# Patient Record
Sex: FEMALE
[De-identification: names, ages, dates, MRNs, and addresses within clinical notes are randomized per-mention and may not be internally consistent; named-entity substitution may affect disease eponyms.]

---

## 2017-04-09 ENCOUNTER — LAB SERVICES (OUTPATIENT)
Dept: OTHER | Age: 37
End: 2017-04-09

## 2017-04-09 ENCOUNTER — CHARTING TRANS (OUTPATIENT)
Dept: OTHER | Age: 37
End: 2017-04-09

## 2017-04-09 LAB
APPEARANCE: CLEAR
BILIRUBIN: NORMAL
COLOR: NORMAL
GLUCOSE U: NORMAL
KETONES: NORMAL
LEUKOCYTES: NORMAL
NITRITE: NORMAL
OCCULT BLOOD: NORMAL
PH: NORMAL
PROTEIN: NORMAL
URINE SPEC GRAVITY: 1.03
UROBILINOGEN: NORMAL

## 2017-04-12 LAB — BACTERIA UR CULT: NORMAL

## 2018-02-25 ENCOUNTER — LAB SERVICES (OUTPATIENT)
Dept: OTHER | Age: 38
End: 2018-02-25

## 2018-02-25 ENCOUNTER — CHARTING TRANS (OUTPATIENT)
Dept: OTHER | Age: 38
End: 2018-02-25

## 2018-02-25 LAB
APPEARANCE: NORMAL
BILIRUBIN: NORMAL
COLOR: NORMAL
GLUCOSE U: NORMAL
KETONES: NORMAL
LEUKOCYTE ESTERASE: NORMAL
LEUKOCYTES: NORMAL
NITRITE: NORMAL
OCCULT BLOOD: NORMAL
PH: NORMAL
PROTEIN: NORMAL
URINE SPEC GRAVITY: NORMAL
UROBILINOGEN: NORMAL

## 2018-02-27 LAB — BACTERIA UR CULT: NORMAL

## 2018-11-01 VITALS
RESPIRATION RATE: 16 BRPM | DIASTOLIC BLOOD PRESSURE: 60 MMHG | HEIGHT: 65 IN | SYSTOLIC BLOOD PRESSURE: 108 MMHG | TEMPERATURE: 98.9 F | BODY MASS INDEX: 21.66 KG/M2 | WEIGHT: 130 LBS | HEART RATE: 77 BPM | OXYGEN SATURATION: 99 %

## 2018-11-05 VITALS
WEIGHT: 130 LBS | SYSTOLIC BLOOD PRESSURE: 110 MMHG | OXYGEN SATURATION: 98 % | BODY MASS INDEX: 21.66 KG/M2 | HEIGHT: 65 IN | TEMPERATURE: 98.4 F | RESPIRATION RATE: 16 BRPM | HEART RATE: 78 BPM | DIASTOLIC BLOOD PRESSURE: 60 MMHG

## 2019-01-18 ENCOUNTER — APPOINTMENT (OUTPATIENT)
Dept: GENERAL RADIOLOGY | Age: 39
End: 2019-01-18
Attending: EMERGENCY MEDICINE
Payer: COMMERCIAL

## 2019-01-18 ENCOUNTER — HOSPITAL ENCOUNTER (OUTPATIENT)
Age: 39
Discharge: HOME | End: 2019-01-18
Attending: EMERGENCY MEDICINE
Payer: COMMERCIAL

## 2019-01-18 VITALS
TEMPERATURE: 97 F | HEIGHT: 65 IN | BODY MASS INDEX: 23.32 KG/M2 | SYSTOLIC BLOOD PRESSURE: 132 MMHG | HEART RATE: 75 BPM | OXYGEN SATURATION: 98 % | RESPIRATION RATE: 16 BRPM | DIASTOLIC BLOOD PRESSURE: 79 MMHG | WEIGHT: 140 LBS

## 2019-01-18 DIAGNOSIS — R05.9 COUGH: Primary | ICD-10-CM

## 2019-01-18 LAB

## 2019-01-18 PROCEDURE — 99204 OFFICE O/P NEW MOD 45 MIN: CPT

## 2019-01-18 PROCEDURE — 99203 OFFICE O/P NEW LOW 30 MIN: CPT

## 2019-01-18 PROCEDURE — 87633 RESP VIRUS 12-25 TARGETS: CPT | Performed by: EMERGENCY MEDICINE

## 2019-01-18 PROCEDURE — 87798 DETECT AGENT NOS DNA AMP: CPT | Performed by: EMERGENCY MEDICINE

## 2019-01-18 PROCEDURE — 87486 CHLMYD PNEUM DNA AMP PROBE: CPT | Performed by: EMERGENCY MEDICINE

## 2019-01-18 PROCEDURE — 71046 X-RAY EXAM CHEST 2 VIEWS: CPT | Performed by: EMERGENCY MEDICINE

## 2019-01-18 PROCEDURE — 87430 STREP A AG IA: CPT

## 2019-01-18 PROCEDURE — 87581 M.PNEUMON DNA AMP PROBE: CPT | Performed by: EMERGENCY MEDICINE

## 2019-01-18 RX ORDER — ALBUTEROL SULFATE 90 UG/1
2 AEROSOL, METERED RESPIRATORY (INHALATION) EVERY 6 HOURS PRN
Qty: 1 INHALER | Refills: 0 | Status: SHIPPED | OUTPATIENT
Start: 2019-01-18 | End: 2019-02-17

## 2019-01-18 RX ORDER — CHLORAL HYDRATE 500 MG
1000 CAPSULE ORAL DAILY
COMMUNITY

## 2019-01-18 RX ORDER — RIBOFLAVIN (VITAMIN B2) 100 MG
100 TABLET ORAL DAILY
COMMUNITY
End: 2021-12-07

## 2019-01-18 RX ORDER — PYRIDOXINE HCL (VITAMIN B6) 100 MG
TABLET ORAL
COMMUNITY

## 2019-01-18 RX ORDER — FAMOTIDINE 20 MG
TABLET ORAL
COMMUNITY

## 2019-01-18 NOTE — ED NOTES
Patient returned to [address] [place] [address] asking for respiratory panel flu expanded. RN spoke with Prachi Carrillo (In [address]) regarding cancelling previous sent pertussis order.   Pertussis order cancel and respiratory panel flu expanded to be sent instead per patient request.

## 2019-01-18 NOTE — ED INITIAL ASSESSMENT (HPI)
Patient states having a dry cough worsening since Sunday. Patient states her son was diagnosed recently with RSV and her daughter was diagnosed with Influenza B. Patient denies having fever. Patient states having SOB, states having chills, body aches.

## 2019-01-18 NOTE — ED PROVIDER NOTES
Patient Seen in: [address]    History   Patient presents with:  Cough/URI    Stated Complaint: cough    HPI    Patient complains of sore throat cough chest pain and shortness of breath. Patient became ill 6 days ago. (Oral)   Resp 16   Ht 165.1 cm (5' 5\")   Wt 63.5 kg   SpO2 98%   Breastfeeding?  Yes   BMI 23.30 kg/m²         Physical Exam    The patient is awake and alert nontoxic in appearance  Eyes pupils are equal reactive sclera nonicteric  ENT ears tympanic membr treatment  of wheezing and shortness of breath. Discussed available testing with the patient. Discussed that flu testing and RSV testing including that of a respiratory panel if any results are positive would not result in change of treatment.   Recommend

## 2021-04-30 ENCOUNTER — HOSPITAL ENCOUNTER (OUTPATIENT)
Age: 41
Discharge: HOME | End: 2021-04-30
Payer: COMMERCIAL

## 2021-04-30 VITALS
DIASTOLIC BLOOD PRESSURE: 68 MMHG | SYSTOLIC BLOOD PRESSURE: 122 MMHG | HEART RATE: 77 BPM | HEIGHT: 65 IN | TEMPERATURE: 98 F | BODY MASS INDEX: 20.83 KG/M2 | OXYGEN SATURATION: 98 % | RESPIRATION RATE: 20 BRPM | WEIGHT: 125 LBS

## 2021-04-30 DIAGNOSIS — J02.9 ACUTE PHARYNGITIS, UNSPECIFIED ETIOLOGY: ICD-10-CM

## 2021-04-30 DIAGNOSIS — H65.02 ACUTE SEROUS OTITIS MEDIA OF LEFT EAR, RECURRENCE NOT SPECIFIED: Primary | ICD-10-CM

## 2021-04-30 DIAGNOSIS — Z20.822 ENCOUNTER FOR SCREENING LABORATORY TESTING FOR COVID-19 VIRUS: ICD-10-CM

## 2021-04-30 DIAGNOSIS — J01.90 ACUTE SINUSITIS, RECURRENCE NOT SPECIFIED, UNSPECIFIED LOCATION: ICD-10-CM

## 2021-04-30 PROCEDURE — 99203 OFFICE O/P NEW LOW 30 MIN: CPT | Performed by: PHYSICIAN ASSISTANT

## 2021-04-30 PROCEDURE — 87880 STREP A ASSAY W/OPTIC: CPT | Performed by: PHYSICIAN ASSISTANT

## 2021-04-30 PROCEDURE — U0002 COVID-19 LAB TEST NON-CDC: HCPCS | Performed by: PHYSICIAN ASSISTANT

## 2021-04-30 RX ORDER — FLUTICASONE PROPIONATE 50 MCG
2 SPRAY, SUSPENSION (ML) NASAL DAILY
Qty: 16 G | Refills: 0 | Status: SHIPPED | OUTPATIENT
Start: 2021-04-30 | End: 2021-05-30

## 2021-04-30 RX ORDER — BUPROPION HYDROCHLORIDE 150 MG/1
150 TABLET ORAL DAILY
COMMUNITY

## 2021-04-30 RX ORDER — AMOXICILLIN AND CLAVULANATE POTASSIUM 875; 125 MG/1; MG/1
1 TABLET, FILM COATED ORAL 2 TIMES DAILY
Qty: 14 TABLET | Refills: 0 | Status: SHIPPED | OUTPATIENT
Start: 2021-04-30 | End: 2021-05-07

## 2021-04-30 NOTE — ED INITIAL ASSESSMENT (HPI)
Pt c/o sore throat, head pressure, nasal congestion x6 days. States her kids with colds and tested neg for covid. No known covid exposure. Awake/alert. Breathing easy and even without distress. Speech clear. Skin warm, dry and pink.

## 2021-04-30 NOTE — ED PROVIDER NOTES
Patient Seen in: [facility]    History   Patient presents with:  Sinus Problem    Stated Complaint: sinus problem    HPI    41-year-old female presents with chief complaint of left earache. Onset 6 days ago.   Patient reports associated sore Age of Onset   • Macular degeneration Father    • Asthma Sister         half sister       Social History    Tobacco Use      Smoking status: Never Smoker      Smokeless tobacco: Never Used    Vaping Use      Vaping Use: Never used    Alcohol use: Not Curre wheezes, or rhonchi. There is no stridor. Air entry is equal.  Cardiovascular: Regular rate and rhythm, no murmurs, gallops, rubs. Capillary refill is brisk. No extremity edema. Gastrointestinal: Abdomen soft, nontender, nondistended.   There is no roseline [address] [facility]  [phone]    Schedule an appointment as soon as possible for a visit in 2 days  For follow-up      Medications Prescribed:  Current Discharge Medication List    START taking these medications    Amoxicillin

## 2021-05-30 ENCOUNTER — HOSPITAL ENCOUNTER (OUTPATIENT)
Age: 41
Discharge: HOME | End: 2021-05-30
Attending: PHYSICIAN ASSISTANT
Payer: COMMERCIAL

## 2021-05-30 VITALS
SYSTOLIC BLOOD PRESSURE: 123 MMHG | OXYGEN SATURATION: 100 % | WEIGHT: 130 LBS | TEMPERATURE: 98 F | RESPIRATION RATE: 17 BRPM | BODY MASS INDEX: 21.66 KG/M2 | HEART RATE: 78 BPM | HEIGHT: 65 IN | DIASTOLIC BLOOD PRESSURE: 78 MMHG

## 2021-05-30 DIAGNOSIS — N39.0 URINARY TRACT INFECTION WITH HEMATURIA, SITE UNSPECIFIED: Primary | ICD-10-CM

## 2021-05-30 DIAGNOSIS — R31.9 URINARY TRACT INFECTION WITH HEMATURIA, SITE UNSPECIFIED: Primary | ICD-10-CM

## 2021-05-30 PROCEDURE — 81025 URINE PREGNANCY TEST: CPT | Performed by: PHYSICIAN ASSISTANT

## 2021-05-30 PROCEDURE — 99213 OFFICE O/P EST LOW 20 MIN: CPT | Performed by: PHYSICIAN ASSISTANT

## 2021-05-30 PROCEDURE — 87086 URINE CULTURE/COLONY COUNT: CPT | Performed by: PHYSICIAN ASSISTANT

## 2021-05-30 RX ORDER — CEPHALEXIN 500 MG/1
500 CAPSULE ORAL 3 TIMES DAILY
Qty: 21 CAPSULE | Refills: 0 | Status: SHIPPED | OUTPATIENT
Start: 2021-05-30 | End: 2021-06-06

## 2021-05-30 NOTE — ED PROVIDER NOTES
Patient Seen in: [facility]    History   Patient presents with:  Urinary Symptoms    Stated Complaint: bladder problem    HPI    Hai Rios is a 38year old female who presents with chief complaint of dysuria. Onset 2 days ago. Smoking status: Never Smoker      Smokeless tobacco: Never Used    Vaping Use      Vaping Use: Never used    Alcohol use: Not Currently    Drug use: Never      Review of Systems    Positive for stated complaint: bladder problem  Other systems are as not deformity. Neurological: Gross motor movement is intact in all 4 extremities. Patient exhibits normal speech. Skin: Skin is normal to inspection. Warm and dry. No obvious bruising. No obvious rash.           ED Course     Labs Reviewed   POCT [facility]

## 2021-05-30 NOTE — ED INITIAL ASSESSMENT (HPI)
Pt presents to the IC with c/o a possible uti. Reports burning with urination, urgency and frequency. Hx of multiple uti's in the last year. Pt was last taking macrobid.

## 2021-06-13 ENCOUNTER — HOSPITAL ENCOUNTER (OUTPATIENT)
Age: 41
Discharge: HOME | End: 2021-06-13
Payer: COMMERCIAL

## 2021-06-13 VITALS
HEART RATE: 86 BPM | DIASTOLIC BLOOD PRESSURE: 70 MMHG | SYSTOLIC BLOOD PRESSURE: 102 MMHG | TEMPERATURE: 98 F | RESPIRATION RATE: 16 BRPM | OXYGEN SATURATION: 100 %

## 2021-06-13 DIAGNOSIS — N30.90 CYSTITIS: Primary | ICD-10-CM

## 2021-06-13 PROCEDURE — 81025 URINE PREGNANCY TEST: CPT | Performed by: NURSE PRACTITIONER

## 2021-06-13 PROCEDURE — 87086 URINE CULTURE/COLONY COUNT: CPT | Performed by: NURSE PRACTITIONER

## 2021-06-13 PROCEDURE — 81002 URINALYSIS NONAUTO W/O SCOPE: CPT | Performed by: NURSE PRACTITIONER

## 2021-06-13 PROCEDURE — 99203 OFFICE O/P NEW LOW 30 MIN: CPT | Performed by: NURSE PRACTITIONER

## 2021-06-13 RX ORDER — NITROFURANTOIN 25; 75 MG/1; MG/1
100 CAPSULE ORAL 2 TIMES DAILY
Qty: 14 CAPSULE | Refills: 0 | Status: SHIPPED | OUTPATIENT
Start: 2021-06-13 | End: 2021-06-20

## 2021-06-13 NOTE — ED PROVIDER NOTES
Patient Seen in: [facility]    History   CC: urinary pain  HPI: Wesley Mcgovern 38year old female  who presents c/o burning dysuria, urgency and frequency which first began yesterday. + Currently on AZO.   Similar symptoms patient advis Fumarate-FA (PRENATAL COMPLETE) 14-0.4 MG Oral Tab,  Take by mouth. Patient not taking: Reported on 4/30/2021    Calcium 500-125 MG-UNIT Oral Tab,  Take by mouth.   Patient not taking: Reported on 6/13/2021    omega-3 fatty acids 1000 MG Oral Cap,  Take 1, previous visit on 5/30/2021 for similar symptoms and no growth on urine culture.   Will empirically initiate treatment with Macrobid as patient states she has had better success with this in the past.  Advised her to continue her plan with GYN follow-up thi

## 2021-06-13 NOTE — ED INITIAL ASSESSMENT (HPI)
Pt started with urinary urgency and pain yesterday, had a Urinary symptoms on May 23rd and culture did not grow anything.

## 2021-11-13 ENCOUNTER — HOSPITAL ENCOUNTER (OUTPATIENT)
Age: 41
Discharge: HOME | End: 2021-11-13
Payer: COMMERCIAL

## 2021-11-13 VITALS
RESPIRATION RATE: 18 BRPM | TEMPERATURE: 98 F | OXYGEN SATURATION: 100 % | DIASTOLIC BLOOD PRESSURE: 75 MMHG | SYSTOLIC BLOOD PRESSURE: 132 MMHG | HEART RATE: 80 BPM

## 2021-11-13 DIAGNOSIS — N30.00 ACUTE CYSTITIS WITHOUT HEMATURIA: Primary | ICD-10-CM

## 2021-11-13 PROCEDURE — 81025 URINE PREGNANCY TEST: CPT | Performed by: NURSE PRACTITIONER

## 2021-11-13 PROCEDURE — 99213 OFFICE O/P EST LOW 20 MIN: CPT | Performed by: NURSE PRACTITIONER

## 2021-11-13 PROCEDURE — 87086 URINE CULTURE/COLONY COUNT: CPT | Performed by: NURSE PRACTITIONER

## 2021-11-13 PROCEDURE — 81002 URINALYSIS NONAUTO W/O SCOPE: CPT | Performed by: NURSE PRACTITIONER

## 2021-11-13 RX ORDER — CEPHALEXIN 500 MG/1
500 CAPSULE ORAL 4 TIMES DAILY
Qty: 28 CAPSULE | Refills: 0 | Status: SHIPPED | OUTPATIENT
Start: 2021-11-13 | End: 2021-11-20

## 2021-11-13 NOTE — ED PROVIDER NOTES
Patient Seen in: [facility]      History   Patient presents with:  Urinary Symptoms    Stated Complaint: urinary problem    Subjective: The history is provided by the patient. Dysuria   This is a new problem.  The current episode started 10/30/2021   SpO2 100%         Physical Exam  Vitals and nursing note reviewed. Constitutional:       Appearance: She is well-developed. HENT:      Head: Normocephalic and atraumatic.       Right Ear: External ear normal.      Left Ear: External ear nor ago.  Patient states she also has urinary frequency. Patient states that she has been taking over-the-counter Azo. Patient denies back pain. There is no CVA tenderness. Denies abdominal pain. Benign abdomen. Denies pelvic pain.   Denies vaginal discha

## 2021-12-28 ENCOUNTER — ORDER TRANSCRIPTION (OUTPATIENT)
Dept: PHYSICAL THERAPY | Facility: HOSPITAL | Age: 41
End: 2021-12-28

## 2021-12-28 DIAGNOSIS — M41.9 SCOLIOSIS, UNSPECIFIED SCOLIOSIS TYPE, UNSPECIFIED SPINAL REGION: Primary | ICD-10-CM

## 2022-01-31 ENCOUNTER — TELEPHONE (OUTPATIENT)
Dept: PHYSICAL THERAPY | Facility: HOSPITAL | Age: 42
End: 2022-01-31

## 2022-02-01 ENCOUNTER — OFFICE VISIT (OUTPATIENT)
Dept: PHYSICAL THERAPY | Age: 42
End: 2022-02-01
Attending: PHYSICIAN ASSISTANT
Payer: COMMERCIAL

## 2022-02-01 DIAGNOSIS — M41.9 SCOLIOSIS, UNSPECIFIED SCOLIOSIS TYPE, UNSPECIFIED SPINAL REGION: ICD-10-CM

## 2022-02-01 PROCEDURE — 97162 PT EVAL MOD COMPLEX 30 MIN: CPT

## 2022-02-01 PROCEDURE — 97530 THERAPEUTIC ACTIVITIES: CPT

## 2022-02-01 PROCEDURE — 97112 NEUROMUSCULAR REEDUCATION: CPT

## 2022-02-08 ENCOUNTER — OFFICE VISIT (OUTPATIENT)
Dept: PHYSICAL THERAPY | Age: 42
End: 2022-02-08
Attending: PHYSICIAN ASSISTANT
Payer: COMMERCIAL

## 2022-02-08 DIAGNOSIS — M41.9 SCOLIOSIS, UNSPECIFIED SCOLIOSIS TYPE, UNSPECIFIED SPINAL REGION: ICD-10-CM

## 2022-02-08 PROCEDURE — 97110 THERAPEUTIC EXERCISES: CPT

## 2022-02-08 PROCEDURE — 97140 MANUAL THERAPY 1/> REGIONS: CPT

## 2022-02-08 PROCEDURE — 97112 NEUROMUSCULAR REEDUCATION: CPT

## 2022-02-08 NOTE — PROGRESS NOTES
Diagnosis: Scoliosis, unspecified scoliosis type, unspecified spinal region (M41.9)  Insurance (Authorized # of Visits):  Jaquelin Villar Physician: Dr. Chris Hardy MD visit: none scheduled  Fall Risk: standard         Precautions: n/a           Cancellations: 0   No Shows: 0  Most recent Evaluation/Progress note: Visit 1 on 2/1/22  Subjective: Pt reports she was very sore after last session for 4 days. She felt pain into her lower right side and into her neck. Currently she still feels some pain in those regions. She saw her pelvic floor therapist. Focused on diaphragmatic breathing to take away from excessive cervical breathing. Current pain: 5/10    Assessment: Pt presents with moderate soft tissue restrictions at thoracic and lumbar paraspinals with TTP. Improved with STM with theragun. Reviewed HEP and initiated strengthening work. Pt fatigues very quickly with exercises. Able to achieve good diaphragmatic breathing and lateral costal expansion by end of session. Moderate difficulty with low trap activation, unable to achieve prone lift off. Improved TrA activation from previous session. Objective: (See Flowsheet Below)    Goals: To be met in 10-12 sessions  1. Pt will verbalize and demo compliance with HEP at least 75% of the time to allow for independent management of symptoms at discharge. - in progress  2. Pt will demo full and pain free trunk mobility - in progress  3. Pt will demo improved core and scapular stability and strength to at least 4/5 for improved stability with functional mobility - in progress  4. Pt will verbalize and demo methods for achieving and maintaining best 3D alignment to decrease future risk for progression and effect of muscle imbalance - in progress  5. Pt will demo symmetrical lateral costal expansion and decreased upper chest/cervical breathing to decrease load on cervical spine - in progress       Date: 2/8/22  Tx #: 2 Date: Tx#: Date:   Tx#:   Therapeutic exercises Prone low trap lift off - stopped secondary to lack of activation    Prone low trap press back - stopped secondary to lack of activation    Standing pivot prone sustained hold then progressed to slight movement/elevation    Supine TrA activation    Short hanging at stall bar    Seated self 1st rib mob/STM with sheet/lacrosse ball    luis f pose fwd and diagonal within limitations     Manual Therapy Prone: theragun/STM B thoracic and lumbar paraspinals     Neuromuscular Re-education Supine diaphragmatic breathing with self cueing    Supine lateral costal expansion with self palpation then with use of RTB for cueing       Current HEP:   2/1: TrA activation, supine lateral costal expansion with maintenance on exhalation, hanging if pull up bar purchased, posture awareness throughout the day  2/8: pivot prone, luis f pose fwd/diagonal, diaphragmatic breathing, lateral costal expansion with RTB for cueing, self 1st rib mob/STM with sheet/ball - pictures/videos taken for reference    Plan: Assess effect of HEP progressions. Continue with manual interventions as indicated. Assess QL. Review low trap activation and may re-try prone low trap. May initiate resisted THELMA with use of mirror cueing for posture cueing. Initiate isometric press down with ball. Initiate combination of diaphragmatic breathing and lateral costal expansion     Charges:  TherEx x2, Man x1, Neuro Re-ed x1       Total Timed Treatment: 55 min  Total Treatment Time: 55 min

## 2022-02-11 ENCOUNTER — TELEPHONE (OUTPATIENT)
Dept: PHYSICAL THERAPY | Facility: HOSPITAL | Age: 42
End: 2022-02-11

## 2022-02-15 ENCOUNTER — APPOINTMENT (OUTPATIENT)
Dept: PHYSICAL THERAPY | Age: 42
End: 2022-02-15
Attending: PHYSICIAN ASSISTANT
Payer: COMMERCIAL

## 2022-02-18 ENCOUNTER — OFFICE VISIT (OUTPATIENT)
Dept: PHYSICAL THERAPY | Age: 42
End: 2022-02-18
Attending: PHYSICIAN ASSISTANT
Payer: COMMERCIAL

## 2022-02-18 PROCEDURE — 97112 NEUROMUSCULAR REEDUCATION: CPT

## 2022-02-18 PROCEDURE — 97110 THERAPEUTIC EXERCISES: CPT

## 2022-02-18 PROCEDURE — 97140 MANUAL THERAPY 1/> REGIONS: CPT

## 2022-02-18 NOTE — PROGRESS NOTES
Diagnosis: Scoliosis, unspecified scoliosis type, unspecified spinal region (M41.9)  Insurance (Authorized # of Visits):  Vi Henderson Physician: Dr. Alex Franklin Next MD visit: none scheduled  Fall Risk: standard         Precautions: n/a           Cancellations: 0   No Shows: 0  Most recent Evaluation/Progress note: Visit 1 on 2/1/22  Subjective: Pt reports she is having a lot of pain into her neck and back. She did not feel she was doing the exercise on the wall correctly because she was feeling a lot in her neck. Current pain: 4/10    Assessment: Continued with manual interventions. Incorporated manual interventions at cervical musculature as this appears to inhibit proper scapular muscle activation. RTB may be too much resistance for pt at this time as she is able to demo best activation with THELMA without resistance. Able to achieve good L lateral costal expansion with cueing although pt does continue to fatigue quickly with exercises. Discussed posture with standing, use of TrA activation for stability and possible use of SI belt based on pt symptom presentation which she will assess at next event involving prolonged standing on Sunday. Objective: (See Flowsheet Below)    Goals: To be met in 10-12 sessions  1. Pt will verbalize and demo compliance with HEP at least 75% of the time to allow for independent management of symptoms at discharge. - in progress  2. Pt will demo full and pain free trunk mobility - in progress  3. Pt will demo improved core and scapular stability and strength to at least 4/5 for improved stability with functional mobility - in progress  4. Pt will verbalize and demo methods for achieving and maintaining best 3D alignment to decrease future risk for progression and effect of muscle imbalance - in progress  5.  Pt will demo symmetrical lateral costal expansion and decreased upper chest/cervical breathing to decrease load on cervical spine - in progress       Date: 2/8/22  Tx #: 2 Date: 2/18/22  Tx#: 3 Date: Tx#:   Therapeutic exercises Prone low trap lift off - stopped secondary to lack of activation    Prone low trap press back - stopped secondary to lack of activation    Standing pivot prone sustained hold then progressed to slight movement/elevation    Supine TrA activation    Short hanging at stall bar    Seated self 1st rib mob/STM with sheet/lacrosse ball    luis f pose fwd and diagonal within limitations Pivot prone at wall -stopped secondary to cervical compensation    Standing THELMA RTB - stopped secondary to cervical compensation    Supine THELMA RTB [id number]    Supine THELMA no resistance    Manual Therapy Prone: theragun/STM B thoracic and lumbar paraspinals Prone: theragun/STM B thoracic and lumbar paraspinals    Seated: TP release UTs    Neuromuscular Re-education Supine diaphragmatic breathing with self cueing    Supine lateral costal expansion with self palpation then with use of RTB for cueing Supine diaphragmatic breathing with self cueing    Supine lateral costal expansion with self palpation then wiht maintenance on exhalation     Supine lateral costal expansion with focus on L lateral with maintenance on exhalation    Seated lateral costal expansion focus on L      Current HEP:   2/1: TrA activation, supine lateral costal expansion with maintenance on exhalation, hanging if pull up bar purchased, posture awareness throughout the day  2/8: pivot prone, luis f pose fwd/diagonal, diaphragmatic breathing, lateral costal expansion with RTB for cueing, self 1st rib mob/STM with sheet/ball - pictures/videos taken for reference  2/18: d/c pivot prone and replace wiht supine THELMA no resistance, lateral costal expansion focused on L with maintenance on exhalation    Plan: Pt to assess symptoms presentation with prolonged standing and email PT with update. May trial SI belt for stability. Assess effect of HEP changes Continue with manual interventions as indicated.  Assess QL. Review low trap activation and may re-try prone low trap. Initiate isometric press down with ball. Initiate posture work with lifting. Charges:  TherEx x1, Man x1, Neuro Re-ed x2       Total Timed Treatment: 55 min  Total Treatment Time: 55 min

## 2022-02-21 ENCOUNTER — APPOINTMENT (OUTPATIENT)
Dept: PHYSICAL THERAPY | Age: 42
End: 2022-02-21
Attending: PHYSICIAN ASSISTANT
Payer: COMMERCIAL

## 2022-02-28 ENCOUNTER — APPOINTMENT (OUTPATIENT)
Dept: PHYSICAL THERAPY | Age: 42
End: 2022-02-28
Attending: PHYSICIAN ASSISTANT
Payer: COMMERCIAL

## 2022-03-04 ENCOUNTER — APPOINTMENT (OUTPATIENT)
Dept: PHYSICAL THERAPY | Age: 42
End: 2022-03-04
Attending: PHYSICIAN ASSISTANT
Payer: COMMERCIAL

## 2022-03-14 ENCOUNTER — APPOINTMENT (OUTPATIENT)
Dept: PHYSICAL THERAPY | Age: 42
End: 2022-03-14
Attending: PHYSICIAN ASSISTANT
Payer: COMMERCIAL

## 2022-03-15 ENCOUNTER — APPOINTMENT (OUTPATIENT)
Dept: PHYSICAL THERAPY | Age: 42
End: 2022-03-15
Attending: PHYSICIAN ASSISTANT
Payer: COMMERCIAL

## 2022-03-21 ENCOUNTER — APPOINTMENT (OUTPATIENT)
Dept: PHYSICAL THERAPY | Age: 42
End: 2022-03-21
Attending: PHYSICIAN ASSISTANT
Payer: COMMERCIAL

## 2022-03-22 ENCOUNTER — APPOINTMENT (OUTPATIENT)
Dept: PHYSICAL THERAPY | Age: 42
End: 2022-03-22
Attending: PHYSICIAN ASSISTANT
Payer: COMMERCIAL

## 2022-03-28 ENCOUNTER — APPOINTMENT (OUTPATIENT)
Dept: PHYSICAL THERAPY | Age: 42
End: 2022-03-28
Attending: PHYSICIAN ASSISTANT
Payer: COMMERCIAL

## 2022-03-29 ENCOUNTER — APPOINTMENT (OUTPATIENT)
Dept: PHYSICAL THERAPY | Age: 42
End: 2022-03-29
Attending: PHYSICIAN ASSISTANT
Payer: COMMERCIAL

## 2022-04-05 ENCOUNTER — APPOINTMENT (OUTPATIENT)
Dept: PHYSICAL THERAPY | Age: 42
End: 2022-04-05
Attending: INTERNAL MEDICINE
Payer: COMMERCIAL

## 2022-04-12 ENCOUNTER — APPOINTMENT (OUTPATIENT)
Dept: PHYSICAL THERAPY | Age: 42
End: 2022-04-12
Attending: INTERNAL MEDICINE
Payer: COMMERCIAL

## 2023-01-15 ENCOUNTER — HOSPITAL ENCOUNTER (OUTPATIENT)
Age: 43
Discharge: HOME | End: 2023-01-15
Payer: COMMERCIAL

## 2023-01-15 VITALS
SYSTOLIC BLOOD PRESSURE: 117 MMHG | HEART RATE: 82 BPM | DIASTOLIC BLOOD PRESSURE: 65 MMHG | OXYGEN SATURATION: 99 % | TEMPERATURE: 98 F | RESPIRATION RATE: 18 BRPM

## 2023-01-15 DIAGNOSIS — N30.90 CYSTITIS: Primary | ICD-10-CM

## 2023-01-15 DIAGNOSIS — R30.0 DYSURIA: ICD-10-CM

## 2023-01-15 LAB
B-HCG UR QL: NEGATIVE
CLARITY UR: CLEAR
GLUCOSE UR STRIP-MCNC: 250 MG/DL
KETONES UR STRIP-MCNC: 15 MG/DL
NITRITE UR QL STRIP: POSITIVE
PH UR STRIP: 5 [PH]
PROT UR STRIP-MCNC: >=300 MG/DL
SP GR UR STRIP: 1.01
UROBILINOGEN UR STRIP-ACNC: >=8 MG/DL

## 2023-01-15 PROCEDURE — 81002 URINALYSIS NONAUTO W/O SCOPE: CPT | Performed by: NURSE PRACTITIONER

## 2023-01-15 PROCEDURE — 87088 URINE BACTERIA CULTURE: CPT | Performed by: NURSE PRACTITIONER

## 2023-01-15 PROCEDURE — 81025 URINE PREGNANCY TEST: CPT | Performed by: NURSE PRACTITIONER

## 2023-01-15 PROCEDURE — 99214 OFFICE O/P EST MOD 30 MIN: CPT | Performed by: NURSE PRACTITIONER

## 2023-01-15 PROCEDURE — 87086 URINE CULTURE/COLONY COUNT: CPT | Performed by: NURSE PRACTITIONER

## 2023-01-15 RX ORDER — PHENAZOPYRIDINE HYDROCHLORIDE 200 MG/1
200 TABLET, FILM COATED ORAL 3 TIMES DAILY PRN
Qty: 6 TABLET | Refills: 0 | Status: SHIPPED | OUTPATIENT
Start: 2023-01-15 | End: 2023-01-22

## 2023-01-15 RX ORDER — PROPRANOLOL HYDROCHLORIDE 10 MG/1
TABLET ORAL
COMMUNITY
Start: 2023-01-12

## 2023-01-15 RX ORDER — CEPHALEXIN 500 MG/1
500 CAPSULE ORAL 2 TIMES DAILY
Qty: 14 CAPSULE | Refills: 0 | Status: SHIPPED | OUTPATIENT
Start: 2023-01-15 | End: 2023-01-22

## 2023-01-15 NOTE — ED INITIAL ASSESSMENT (HPI)
Pt c/o urgency/frequency, burning sensation with urination and pelvic pain x 1 day.  Denies fever, chills or back pain

## 2024-02-22 ENCOUNTER — HOSPITAL ENCOUNTER (OUTPATIENT)
Age: 44
Discharge: HOME | End: 2024-02-22
Payer: COMMERCIAL

## 2024-02-22 VITALS
OXYGEN SATURATION: 100 % | HEART RATE: 107 BPM | TEMPERATURE: 102 F | SYSTOLIC BLOOD PRESSURE: 127 MMHG | RESPIRATION RATE: 18 BRPM | DIASTOLIC BLOOD PRESSURE: 65 MMHG

## 2024-02-22 DIAGNOSIS — J02.0 STREPTOCOCCAL SORE THROAT: Primary | ICD-10-CM

## 2024-02-22 LAB — S PYO AG THROAT QL: POSITIVE

## 2024-02-22 PROCEDURE — 87880 STREP A ASSAY W/OPTIC: CPT | Performed by: NURSE PRACTITIONER

## 2024-02-22 PROCEDURE — 99213 OFFICE O/P EST LOW 20 MIN: CPT | Performed by: NURSE PRACTITIONER

## 2024-02-22 RX ORDER — PENICILLIN V POTASSIUM 500 MG/1
500 TABLET ORAL 2 TIMES DAILY
Qty: 20 TABLET | Refills: 0 | Status: SHIPPED | OUTPATIENT
Start: 2024-02-22 | End: 2024-03-03

## 2024-02-22 RX ORDER — DEXAMETHASONE SODIUM PHOSPHATE 10 MG/ML
10 INJECTION, SOLUTION INTRAMUSCULAR; INTRAVENOUS ONCE
Status: COMPLETED | OUTPATIENT
Start: 2024-02-22 | End: 2024-02-22

## 2024-02-22 RX ORDER — PENICILLIN V POTASSIUM 500 MG/1
500 TABLET ORAL 2 TIMES DAILY
Qty: 20 TABLET | Refills: 0 | Status: SHIPPED | OUTPATIENT
Start: 2024-02-22 | End: 2024-02-22

## 2024-02-22 RX ORDER — BENZOCAINE AND MENTHOL, UNSPECIFIED FORM 15; 2.3 MG/1; MG/1
1 LOZENGE ORAL AS NEEDED
Qty: 16 LOZENGE | Refills: 0 | Status: SHIPPED | OUTPATIENT
Start: 2024-02-22

## 2024-02-23 NOTE — ED PROVIDER NOTES
Patient Seen in: [facility]      History     Chief Complaint   Patient presents with    Sore Throat     Stated Complaint: Sore Throat    Subjective:   HPI    This is a well-appearing 43-year-old who presents with a sore throat which started on Monday.  No cough congestion or other URI symptoms.  Reports pain with swallowing.  No posterior neck.  No rash.    Objective:   No pertinent past medical history.            No pertinent past surgical history.              No pertinent social history.            Review of Systems   Constitutional:  Positive for fever.   HENT:  Positive for sore throat.    All other systems reviewed and are negative.      Positive for stated complaint: Sore Throat  Other systems are as noted in HPI.  Constitutional and vital signs reviewed.      All other systems reviewed and negative except as noted above.    Physical Exam     ED Triage Vitals [02/22/24 1821]   /65   Pulse 107   Resp 18   Temp (!) 101.5 °F (38.6 °C)   Temp src Oral   SpO2 100 %   O2 Device None (Room air)       Current:/65   Pulse 107   Temp (!) 101.5 °F (38.6 °C) (Oral)   Resp 18   LMP 02/02/2024 (Approximate)   SpO2 100%         Physical Exam  Vitals and nursing note reviewed.   Constitutional:       General: She is awake. She is not in acute distress.     Appearance: Normal appearance. She is not ill-appearing, toxic-appearing or diaphoretic.   HENT:      Head: Normocephalic and atraumatic.      Right Ear: Tympanic membrane, ear canal and external ear normal. No tenderness. No middle ear effusion. Tympanic membrane is not erythematous.      Left Ear: Tympanic membrane, ear canal and external ear normal. No tenderness.  No middle ear effusion. Tympanic membrane is not erythematous.      Nose: Nose normal. No congestion or rhinorrhea.      Mouth/Throat:      Lips: Pink.      Mouth: Mucous membranes are moist. No oral lesions.      Pharynx: Oropharynx is clear. Uvula midline. Posterior  oropharyngeal erythema present. No pharyngeal swelling, oropharyngeal exudate or uvula swelling.      Tonsils: No tonsillar exudate or tonsillar abscesses.   Eyes:      General: Lids are normal.      Extraocular Movements: Extraocular movements intact.      Conjunctiva/sclera: Conjunctivae normal.      Pupils: Pupils are equal, round, and reactive to light.   Cardiovascular:      Rate and Rhythm: Normal rate and regular rhythm.      Pulses: Normal pulses.      Heart sounds: Normal heart sounds.   Pulmonary:      Effort: Pulmonary effort is normal.      Breath sounds: Normal breath sounds and air entry. No stridor, decreased air movement or transmitted upper airway sounds.   Musculoskeletal:      Cervical back: Full passive range of motion without pain and normal range of motion.   Skin:     General: Skin is warm and dry.      Capillary Refill: Capillary refill takes less than 2 seconds.   Neurological:      General: No focal deficit present.      Mental Status: She is alert and oriented to person, place, and time.   Psychiatric:         Mood and Affect: Mood normal.         Behavior: Behavior normal. Behavior is cooperative.         Thought Content: Thought content normal.         Judgment: Judgment normal.       ED Course     Labs Reviewed   POCT RAPID STREP - Abnormal; Notable for the following components:       Result Value    POCT Rapid Strep Positive (*)     All other components within normal limits     Strep positive.  Decadron,  Motrin ordered.  MDM     Medical Decision Making  Patient is well-appearing exam, nontoxic in appearance, exam as noted above.  Differential diagnoses including but not limited to viral versus bacterial pharyngitis.  Strep here was positive.  Discussed the findings with her.  Patient has clear speech, no drooling, easy respirations.  1 dose of Decadron given.  I have discussed with the patient's daughter gargles, tea with honey.  Antipyretic.  Will treat with penicillin and Cepacol as  well.  Strict ER precautions given.    Problems Addressed:  Streptococcal sore throat: acute illness or injury    Amount and/or Complexity of Data Reviewed  ECG/medicine tests: ordered and independent interpretation performed. Decision-making details documented in ED Course.    Risk  OTC drugs.  Prescription drug management.        Disposition and Plan     Clinical Impression:  1. Streptococcal sore throat         Disposition:  Discharge  2/22/2024  6:42 pm    Follow-up:  Hashimoto, Amy, MD  [address] [place] [address]  [phone]                Medications Prescribed:  Discharge Medication List as of 2/22/2024  6:43 PM        START taking these medications    Details   Benzocaine-Menthol (CEPACOL) 15-2.3 MG Mouth/Throat Lozenge Use as directed 1 lozenge in the mouth or throat as needed., Print, Disp-16 lozenge, R-0

## 2024-03-21 ENCOUNTER — APPOINTMENT (OUTPATIENT)
Dept: SURGERY | Age: 44
End: 2024-03-21

## 2024-03-21 VITALS — HEIGHT: 65 IN | BODY MASS INDEX: 20.83 KG/M2 | WEIGHT: 125 LBS

## 2024-03-21 DIAGNOSIS — R92.2 DENSE BREASTS: ICD-10-CM

## 2024-03-21 DIAGNOSIS — R92.30 DENSE BREASTS: ICD-10-CM

## 2024-03-21 DIAGNOSIS — N63.11 MASS OF UPPER OUTER QUADRANT OF RIGHT BREAST: Primary | ICD-10-CM

## 2024-03-21 RX ORDER — BUPROPION HYDROCHLORIDE 300 MG/1
300 TABLET ORAL DAILY
COMMUNITY

## 2024-03-21 RX ORDER — PENICILLIN V POTASSIUM 500 MG/1
500 TABLET ORAL 4 TIMES DAILY
COMMUNITY

## 2024-03-21 RX ORDER — ASCORBATE CALCIUM 500 MG
TABLET ORAL
COMMUNITY

## 2024-03-21 RX ORDER — BUPROPION HYDROCHLORIDE 150 MG/1
150 TABLET ORAL DAILY
COMMUNITY

## 2024-03-21 RX ORDER — PUMPKIN SEED EXTRACT/SOY GERM 300 MG
CAPSULE ORAL
COMMUNITY

## 2024-03-21 RX ORDER — LAMOTRIGINE 200 MG/1
200 TABLET ORAL DAILY
COMMUNITY

## 2024-03-21 RX ORDER — SERTRALINE HYDROCHLORIDE 100 MG/1
100 TABLET, FILM COATED ORAL DAILY
COMMUNITY

## 2024-04-08 ENCOUNTER — APPOINTMENT (OUTPATIENT)
Dept: SURGERY | Age: 44
End: 2024-04-08

## 2024-04-08 VITALS — WEIGHT: 125 LBS | BODY MASS INDEX: 20.83 KG/M2 | HEIGHT: 65 IN

## 2024-04-08 DIAGNOSIS — N63.11 MASS OF UPPER OUTER QUADRANT OF RIGHT BREAST: Primary | ICD-10-CM

## 2024-04-08 PROCEDURE — 99212 OFFICE O/P EST SF 10 MIN: CPT | Performed by: SURGERY

## 2024-04-08 PROCEDURE — 19083 BX BREAST 1ST LESION US IMAG: CPT | Performed by: SURGERY

## 2024-04-13 LAB
ASR DISCLAIMER: NORMAL
CASE RPRT: NORMAL
CLINICAL INFO: NORMAL
PATH REPORT.GROSS SPEC: NORMAL
PATH REPORT.MICROSCOPIC SPEC OTHER STN: NORMAL

## 2024-04-17 ENCOUNTER — TELEPHONE (OUTPATIENT)
Dept: SURGERY | Age: 44
End: 2024-04-17

## 2024-04-18 ENCOUNTER — TELEPHONE (OUTPATIENT)
Dept: ONCOLOGY | Age: 44
End: 2024-04-18

## 2024-04-30 ENCOUNTER — APPOINTMENT (OUTPATIENT)
Dept: SURGERY | Age: 44
End: 2024-04-30

## 2024-11-23 ENCOUNTER — HOSPITAL ENCOUNTER (OUTPATIENT)
Age: 44
Discharge: HOME | End: 2024-11-23
Payer: COMMERCIAL

## 2024-11-23 VITALS
TEMPERATURE: 98 F | HEART RATE: 77 BPM | RESPIRATION RATE: 18 BRPM | DIASTOLIC BLOOD PRESSURE: 65 MMHG | SYSTOLIC BLOOD PRESSURE: 105 MMHG | OXYGEN SATURATION: 100 %

## 2024-11-23 DIAGNOSIS — N39.0 URINARY TRACT INFECTION WITHOUT HEMATURIA, SITE UNSPECIFIED: Primary | ICD-10-CM

## 2024-11-23 LAB
GLUCOSE UR STRIP-MCNC: 100 MG/DL
HGB UR QL STRIP: NEGATIVE
NITRITE UR QL STRIP: POSITIVE
PH UR STRIP: 7 [PH]
PROT UR STRIP-MCNC: 30 MG/DL
SP GR UR STRIP: 1.02
UROBILINOGEN UR STRIP-ACNC: 2 MG/DL

## 2024-11-23 PROCEDURE — 99213 OFFICE O/P EST LOW 20 MIN: CPT | Performed by: NURSE PRACTITIONER

## 2024-11-23 PROCEDURE — 81002 URINALYSIS NONAUTO W/O SCOPE: CPT | Performed by: NURSE PRACTITIONER

## 2024-11-23 RX ORDER — CEFADROXIL 500 MG/1
500 CAPSULE ORAL 2 TIMES DAILY
Qty: 14 CAPSULE | Refills: 0 | Status: SHIPPED | OUTPATIENT
Start: 2024-11-23 | End: 2024-11-30

## 2024-11-23 NOTE — ED PROVIDER NOTES
Patient Seen in: [facility]      History     Chief Complaint   Patient presents with    Urinary Symptoms     Stated Complaint: Urinary issue    Subjective:   HPI      44-year-old female presents with dysuria urgency frequency x 2 days low back pain no flank pain no abdominal pain no fever no chills    Objective:     Past Medical History:    Depression    Scoliosis              History reviewed. No pertinent surgical history.             Social History     Socioeconomic History    Marital status:    Tobacco Use    Smoking status: Never    Smokeless tobacco: Never   Vaping Use    Vaping status: Never Used   Substance and Sexual Activity    Alcohol use: Not Currently    Drug use: Never              Review of Systems    Positive for stated complaint: Urinary issue  Other systems are as noted in HPI.  Constitutional and vital signs reviewed.      All other systems reviewed and negative except as noted above.    Physical Exam     ED Triage Vitals [11/23/24 0848]   /65   Pulse 77   Resp 18   Temp 97.8 °F (36.6 °C)   Temp src Oral   SpO2 100 %   O2 Device None (Room air)       Current Vitals:   Vital Signs  BP: 105/65  Pulse: 77  Resp: 18  Temp: 97.8 °F (36.6 °C)  Temp src: Oral    Oxygen Therapy  SpO2: 100 %  O2 Device: None (Room air)        Physical Exam  Vitals and nursing note reviewed.   Constitutional:       Appearance: Normal appearance.   Cardiovascular:      Rate and Rhythm: Normal rate.      Pulses: Normal pulses.   Pulmonary:      Effort: Pulmonary effort is normal.   Abdominal:      Tenderness: There is no abdominal tenderness. There is no right CVA tenderness or left CVA tenderness.   Neurological:      Mental Status: She is alert.             ED Course     Labs Reviewed   [facility] POCT URINALYSIS DIPSTICK - Abnormal; Notable for the following components:       Result Value    Urine Clarity Cloudy (*)     Protein urine 30 (*)     Glucose, Urine 100 (*)     Ketone, Urine Trace (*)      Bilirubin, Urine Small (*)     Nitrite Urine Positive (*)     Urobilinogen urine 2.0 (*)     Leukocyte esterase urine Trace (*)     All other components within normal limits                   MDM              Medical Decision Making  Patient presents  for dysuria.  History and physical exam as above.  Denies abdominal tenderness or flank pain.  Afebrile, nontoxic and does not meet SIRS criteria.  UA consistent with UTI.  Will treat with antibiotics and AZO.  Recommend follow-up with PCP.  Counseled on hygiene and preventative measures.  Return to ED precautions discussed with the patient who verbalized understanding.       Problems Addressed:  Urinary tract infection without hematuria, site unspecified: acute illness or injury with systemic symptoms    Risk  OTC drugs.  Prescription drug management.        Disposition and Plan     Clinical Impression:  1. Urinary tract infection without hematuria, site unspecified         Disposition:  Discharge  11/23/2024  9:25 am    Follow-up:  Hashimoto, Amy, MD  [address] [place] [address]  [phone]    Schedule an appointment as soon as possible for a visit in 3 days  If symptoms worsen          Medications Prescribed:  Current Discharge Medication List        START taking these medications    Details   cefadroxil 500 MG Oral Cap Take 1 capsule (500 mg total) by mouth 2 (two) times daily for 7 days.  Qty: 14 capsule, Refills: 0                 Supplementary Documentation:

## 2024-11-23 NOTE — ED INITIAL ASSESSMENT (HPI)
Dysuria, urinary urgency since 2 days ago. Attempted taking azo with slight relief. Slight  right lower back pain. Denies abd pain, hematuria, fevers or chills.

## 2024-12-19 ENCOUNTER — HOSPITAL ENCOUNTER (OUTPATIENT)
Age: 44
Discharge: HOME | End: 2024-12-19
Payer: COMMERCIAL

## 2024-12-19 VITALS
OXYGEN SATURATION: 100 % | HEART RATE: 88 BPM | TEMPERATURE: 99 F | SYSTOLIC BLOOD PRESSURE: 134 MMHG | RESPIRATION RATE: 18 BRPM | DIASTOLIC BLOOD PRESSURE: 73 MMHG

## 2024-12-19 DIAGNOSIS — B34.9 VIRAL ILLNESS: Primary | ICD-10-CM

## 2024-12-19 LAB — S PYO AG THROAT QL: NEGATIVE

## 2024-12-19 PROCEDURE — 87880 STREP A ASSAY W/OPTIC: CPT | Performed by: NURSE PRACTITIONER

## 2024-12-19 PROCEDURE — 99213 OFFICE O/P EST LOW 20 MIN: CPT | Performed by: NURSE PRACTITIONER

## 2024-12-19 NOTE — ED PROVIDER NOTES
Patient Seen in: [facility]      History     Chief Complaint   Patient presents with    Sore Throat     Stated Complaint: sore throat,ear pain    Subjective:   HPI    44-year-old female presents with sore throat and ear pain.  Afebrile appears in no acute distress.      Objective:     Past Medical History:    Depression    Scoliosis              History reviewed. No pertinent surgical history.             Social History     Socioeconomic History    Marital status:    Tobacco Use    Smoking status: Never    Smokeless tobacco: Never   Vaping Use    Vaping status: Never Used   Substance and Sexual Activity    Alcohol use: Not Currently    Drug use: Never              Review of Systems    Positive for stated complaint: sore throat,ear pain  Other systems are as noted in HPI.  Constitutional and vital signs reviewed.      All other systems reviewed and negative except as noted above.    Physical Exam     ED Triage Vitals [12/19/24 0922]   /73   Pulse 88   Resp 18   Temp 98.5 °F (36.9 °C)   Temp src Oral   SpO2 100 %   O2 Device None (Room air)       Current Vitals:   Vital Signs  BP: 134/73  Pulse: 88  Resp: 18  Temp: 98.5 °F (36.9 °C)  Temp src: Oral    Oxygen Therapy  SpO2: 100 %  O2 Device: None (Room air)        Physical Exam  Vitals reviewed.   Constitutional:       General: She is not in acute distress.     Appearance: She is not ill-appearing.   HENT:      Right Ear: Tympanic membrane and ear canal normal.      Left Ear: Tympanic membrane and ear canal normal.      Mouth/Throat:      Mouth: Mucous membranes are moist. No oral lesions.      Pharynx: Posterior oropharyngeal erythema present. No pharyngeal swelling, oropharyngeal exudate or uvula swelling.      Tonsils: No tonsillar exudate or tonsillar abscesses.   Cardiovascular:      Rate and Rhythm: Normal rate and regular rhythm.   Pulmonary:      Effort: Pulmonary effort is normal.      Breath sounds: Normal breath sounds.    Musculoskeletal:      Cervical back: Normal range of motion and neck supple.   Lymphadenopathy:      Cervical: No cervical adenopathy.   Skin:     General: Skin is warm and dry.   Neurological:      General: No focal deficit present.      Mental Status: She is alert and oriented to person, place, and time.   Psychiatric:         Mood and Affect: Mood normal.         Behavior: Behavior normal.             ED Course     Labs Reviewed   POCT RAPID STREP - Normal                   MDM              Medical Decision Making  44-year-old female with sore throat.  Differential diagnosis includes viral pharyngitis versus strep pharyngitis.  POC strep is negative.  Results discussed with patient.  Patient was given instructions for help with viral illness symptoms.  She declined printed instructions.    Amount and/or Complexity of Data Reviewed  Labs: ordered.     Details: POC strep is negative    Risk  OTC drugs.        Disposition and Plan     Clinical Impression:  1. Viral illness         Disposition:  Discharge  12/19/2024  9:40 am    Follow-up:  Hashimoto, Amy, MD  [address] [place] [id number]  [phone]      If symptoms worsen          Medications Prescribed:  Discharge Medication List as of 12/19/2024  9:41 AM              Supplementary Documentation:

## 2025-02-07 ENCOUNTER — HOSPITAL ENCOUNTER (OUTPATIENT)
Age: 45
Discharge: HOME | End: 2025-02-07
Payer: COMMERCIAL

## 2025-02-07 VITALS
OXYGEN SATURATION: 100 % | RESPIRATION RATE: 16 BRPM | TEMPERATURE: 98 F | DIASTOLIC BLOOD PRESSURE: 77 MMHG | SYSTOLIC BLOOD PRESSURE: 126 MMHG | HEART RATE: 86 BPM

## 2025-02-07 DIAGNOSIS — R30.0 DYSURIA: ICD-10-CM

## 2025-02-07 DIAGNOSIS — N30.00 ACUTE CYSTITIS WITHOUT HEMATURIA: Primary | ICD-10-CM

## 2025-02-07 LAB
BILIRUB UR QL STRIP: NEGATIVE
COLOR UR: YELLOW
GLUCOSE UR STRIP-MCNC: NEGATIVE MG/DL
HGB UR QL STRIP: NEGATIVE
KETONES UR STRIP-MCNC: NEGATIVE MG/DL
NITRITE UR QL STRIP: NEGATIVE
PH UR STRIP: 7 [PH]
PROT UR STRIP-MCNC: NEGATIVE MG/DL
SP GR UR STRIP: 1.02
UROBILINOGEN UR STRIP-ACNC: <2 MG/DL

## 2025-02-07 PROCEDURE — 87086 URINE CULTURE/COLONY COUNT: CPT | Performed by: NURSE PRACTITIONER

## 2025-02-07 PROCEDURE — 87088 URINE BACTERIA CULTURE: CPT | Performed by: NURSE PRACTITIONER

## 2025-02-07 PROCEDURE — 87186 SC STD MICRODIL/AGAR DIL: CPT | Performed by: NURSE PRACTITIONER

## 2025-02-07 RX ORDER — NITROFURANTOIN 25; 75 MG/1; MG/1
100 CAPSULE ORAL 2 TIMES DAILY
Qty: 20 CAPSULE | Refills: 0 | Status: SHIPPED | OUTPATIENT
Start: 2025-02-07 | End: 2025-02-17

## 2025-02-07 NOTE — ED PROVIDER NOTES
Patient Seen in: [facility]      History   No chief complaint on file.    Stated Complaint: Urinary Symptoms    Subjective:   HPI      44-year-old female presents with dysuria x 1 day. Taking Azo with minimal improvement. No concern for STI, vaginal discharge/pain.  Last antibiotic use November 2024.  Denies f/c/n/v/d. No recent sick exposures. Denies recent infections/covid exposures.  No gross hematuria.      Objective:     Past Medical History:    Depression    Scoliosis              History reviewed. No pertinent surgical history.             Social History     Socioeconomic History    Marital status:    Tobacco Use    Smoking status: Never     Passive exposure: Never    Smokeless tobacco: Never   Vaping Use    Vaping status: Never Used   Substance and Sexual Activity    Alcohol use: Not Currently    Drug use: Never              Review of Systems    Positive for stated complaint: Urinary Symptoms  Other systems are as noted in HPI.  Constitutional and vital signs reviewed.      All other systems reviewed and negative except as noted above.    Physical Exam     ED Triage Vitals [02/07/25 1229]   /77   Pulse 86   Resp 16   Temp 97.6 °F (36.4 °C)   Temp src Oral   SpO2 100 %   O2 Device None (Room air)       Current Vitals:   Vital Signs  BP: 126/77  Pulse: 86  Resp: 16  Temp: 97.6 °F (36.4 °C)  Temp src: Oral    Oxygen Therapy  SpO2: 100 %  O2 Device: None (Room air)        Physical Exam  Vitals and nursing note reviewed.   Constitutional:       General: She is not in acute distress.     Appearance: She is not toxic-appearing.   HENT:      Head: Normocephalic.      Nose: Nose normal. No congestion or rhinorrhea.      Mouth/Throat:      Mouth: Mucous membranes are moist.   Pulmonary:      Effort: Pulmonary effort is normal. No respiratory distress.   Abdominal:      General: Abdomen is flat.      Tenderness: There is no right CVA tenderness or left CVA tenderness.   Musculoskeletal:          General: Normal range of motion.      Cervical back: Normal range of motion.   Skin:     General: Skin is warm and dry.      Capillary Refill: Capillary refill takes less than 2 seconds.   Neurological:      General: No focal deficit present.      Mental Status: She is alert.             ED Course     Labs Reviewed   [facility] POCT URINALYSIS DIPSTICK - Abnormal; Notable for the following components:       Result Value    Urine Clarity Cloudy (*)     Leukocyte esterase urine Small (*)     All other components within normal limits   URINE CULTURE, ROUTINE                   MDM              Medical Decision Making  44-year-old female presents with dysuria, with UA consistent with UTI.    UA is cloudy, small leuks, culture sent.  Will treat.  Discussed with patient, discussed supportive care.    No abdominal pain, no back pain less likely for pyelonephritis, nephrolithiasis.    Physical exam remained stable over serial reexaminations as previously documented. External chart review completed.     I have discussed with the patient the results of tests, differential diagnosis, and warning signs and symptoms that should prompt immediate return.  The patient understands these instructions and agrees to the follow-up plan provided.  There are no barriers to learning.   Appropriate f/u given.  Patient agrees to return for any concerns/problems/complications.    Differential diagnosis reflecting the complexity of care include: Dysuria, UTI, pyelonephritis, nephrolithiasis    Comorbidities that add complexity to management include: Previous UTI 3 months ago    External chart review was done and was noted:Yes    History obtained by an independent source was from: Patient    Discussions of management was done with:Patient    My independent interpretation of studies of: UA    Diagnostic tests and medications considered but not ordered were: None    Social determinants of health that affect care:NA    Shared decision making was done  by Self, Patient             Disposition and Plan     Clinical Impression:  1. Acute cystitis without hematuria    2. Dysuria         Disposition:  Discharge  2/7/2025 12:50 pm    Follow-up:  Hashimoto, Amy, MD  [address] [place] [address]  [phone]                Medications Prescribed:  Discharge Medication List as of 2/7/2025 12:50 PM        START taking these medications    Details   nitrofurantoin monohydrate macro 100 MG Oral Cap Take 1 capsule (100 mg total) by mouth 2 (two) times daily for 10 days., Normal, Disp-20 capsule, R-0                 Supplementary Documentation:

## 2025-02-07 NOTE — DISCHARGE INSTRUCTIONS
You have been diagnosed for a Urinary Tract Infection (UTI), based on your preliminary lab  results and your symptoms.    Take antibiotics until completely gone:  A culture was sent off to confirm that you had a UTI    You will be called if bacteria grows that is not covered by the antibiotic prescribed to you    Follow up with your PCP in the next 5-7 days for reassessment if no improvement, there can be  other etiologies that can cause symptoms similar to UTI    Go to the ER with:  - Abdominal pain  - Fevers  - Vomiting  - Back pain  - New/worsening symptoms

## 2025-07-05 ENCOUNTER — HOSPITAL ENCOUNTER (OUTPATIENT)
Age: 45
Discharge: HOME | End: 2025-07-05
Payer: COMMERCIAL

## 2025-07-05 VITALS
RESPIRATION RATE: 18 BRPM | SYSTOLIC BLOOD PRESSURE: 126 MMHG | DIASTOLIC BLOOD PRESSURE: 84 MMHG | HEART RATE: 78 BPM | HEIGHT: 65 IN | BODY MASS INDEX: 20.83 KG/M2 | OXYGEN SATURATION: 100 % | WEIGHT: 125 LBS | TEMPERATURE: 98 F

## 2025-07-05 DIAGNOSIS — N30.90 CYSTITIS: Primary | ICD-10-CM

## 2025-07-05 LAB
GLUCOSE UR STRIP-MCNC: 250 MG/DL
KETONES UR STRIP-MCNC: 15 MG/DL
NITRITE UR QL STRIP: POSITIVE
PH UR STRIP: 5 [PH]
PROT UR STRIP-MCNC: >=300 MG/DL
SP GR UR STRIP: <=1.005
UROBILINOGEN UR STRIP-ACNC: >=8 MG/DL

## 2025-07-05 PROCEDURE — 87186 SC STD MICRODIL/AGAR DIL: CPT | Performed by: NURSE PRACTITIONER

## 2025-07-05 PROCEDURE — 99213 OFFICE O/P EST LOW 20 MIN: CPT | Performed by: NURSE PRACTITIONER

## 2025-07-05 PROCEDURE — 87086 URINE CULTURE/COLONY COUNT: CPT | Performed by: NURSE PRACTITIONER

## 2025-07-05 PROCEDURE — 81002 URINALYSIS NONAUTO W/O SCOPE: CPT | Performed by: NURSE PRACTITIONER

## 2025-07-05 PROCEDURE — 87077 CULTURE AEROBIC IDENTIFY: CPT | Performed by: NURSE PRACTITIONER

## 2025-07-05 RX ORDER — NITROFURANTOIN 25; 75 MG/1; MG/1
100 CAPSULE ORAL 2 TIMES DAILY
Qty: 10 CAPSULE | Refills: 0 | Status: SHIPPED | OUTPATIENT
Start: 2025-07-05 | End: 2025-07-10

## 2025-07-05 NOTE — ED PROVIDER NOTES
Patient Seen in: [facility]        History  Chief Complaint   Patient presents with    Urinary Symptoms            Stated Complaint: Urinary Symptoms    Subjective:   45-year-old female presents to immediate care for dysuria.  Patient states symptoms started yesterday with frequency and burning.              Objective:     Past Medical History:    Depression    Scoliosis              History reviewed. No pertinent surgical history.             Social History     Socioeconomic History    Marital status:    Tobacco Use    Smoking status: Never     Passive exposure: Never    Smokeless tobacco: Never   Vaping Use    Vaping status: Never Used   Substance and Sexual Activity    Alcohol use: Not Currently    Drug use: Never              Review of Systems   Constitutional: Negative.    Respiratory: Negative.     Cardiovascular: Negative.    Gastrointestinal: Negative.    Skin: Negative.    Neurological: Negative.        Positive for stated complaint: Urinary Symptoms  Other systems are as noted in HPI.  Constitutional and vital signs reviewed.      All other systems reviewed and negative except as noted above.                  Physical Exam    ED Triage Vitals [07/05/25 0808]   /84   Pulse 78   Resp 18   Temp 98.2 °F (36.8 °C)   Temp src Oral   SpO2 100 %   O2 Device None (Room air)       Current Vitals:   Vital Signs  BP: 126/84  Pulse: 78  Resp: 18  Temp: 98.2 °F (36.8 °C)  Temp src: Oral    Oxygen Therapy  SpO2: 100 %  O2 Device: None (Room air)            Physical Exam  Vitals and nursing note reviewed.   Constitutional:       General: She is not in acute distress.  HENT:      Head: Normocephalic.   Cardiovascular:      Rate and Rhythm: Normal rate.   Pulmonary:      Effort: Pulmonary effort is normal.   Musculoskeletal:         General: Normal range of motion.   Skin:     General: Skin is warm and dry.   Neurological:      General: No focal deficit present.      Mental Status: She is alert  and oriented to person, place, and time.                 ED Course  Labs Reviewed   [facility] POCT URINALYSIS DIPSTICK - Abnormal; Notable for the following components:       Result Value    Urine Color Orange (*)     Urine Clarity Slightly cloudy (*)     Protein urine >=300 (*)     Glucose, Urine 250 (*)     Ketone, Urine 15 (*)     Bilirubin, Urine Moderate (*)     Blood, Urine Trace-Intact (*)     Nitrite Urine Positive (*)     Urobilinogen urine >=8.0 (*)     Leukocyte esterase urine Large (*)     All other components within normal limits   URINE CULTURE, ROUTINE                MDM       Medical Decision Making  Pertinent Labs & Imaging studies reviewed. (See chart for details).  Patient coming in with dysuria.   Differential diagnosis includes urinary tract infection     Patient is comfortable with plan of care.     Overall Pt looks good. Non-toxic, well-hydrated and in no respiratory distress. Vital signs are reassuring. Exam is reassuring. I do not believe pt requires and additional diagnostic studies or intervention. I believe pt can be discharged home to continue evaluation as an outpatient. Follow-up provider given. Discharge instructions given and reviewed. Return for any problems. All understand and agrees with the plan.        Problems Addressed:  Cystitis: acute illness or injury    Amount and/or Complexity of Data Reviewed  Labs: ordered. Decision-making details documented in ED Course.    Risk  OTC drugs.  Prescription drug management.        Disposition and Plan     Clinical Impression:  1. Cystitis         Disposition:  Discharge  7/5/2025  8:26 am    Follow-up:  Hashimoto, Amy, MD  [address] [place] [id number]  [phone]                Medications Prescribed:  Discharge Medication List as of 7/5/2025  8:26 AM        START taking these medications    Details   nitrofurantoin monohydrate macro (MACROBID) 100 MG Oral Cap Take 1 capsule (100 mg total) by mouth 2 (two) times daily for 5  days., Normal, Disp-10 capsule, R-0                   Supplementary Documentation:

## 2025-07-08 ENCOUNTER — HOSPITAL ENCOUNTER (OUTPATIENT)
Age: 45
Discharge: HOME | End: 2025-07-08
Payer: COMMERCIAL

## 2025-07-08 VITALS
BODY MASS INDEX: 20.83 KG/M2 | HEIGHT: 65 IN | TEMPERATURE: 98 F | DIASTOLIC BLOOD PRESSURE: 72 MMHG | OXYGEN SATURATION: 98 % | HEART RATE: 78 BPM | WEIGHT: 125 LBS | SYSTOLIC BLOOD PRESSURE: 113 MMHG | RESPIRATION RATE: 18 BRPM

## 2025-07-08 DIAGNOSIS — R30.0 DYSURIA: Primary | ICD-10-CM

## 2025-07-08 LAB
B-HCG UR QL: NEGATIVE
CLARITY UR: CLEAR
GLUCOSE UR STRIP-MCNC: 100 MG/DL
HGB UR QL STRIP: NEGATIVE
NITRITE UR QL STRIP: POSITIVE
PH UR STRIP: 6.5 [PH]
PROT UR STRIP-MCNC: 30 MG/DL
SP GR UR STRIP: 1.02
UROBILINOGEN UR STRIP-ACNC: 4 MG/DL

## 2025-07-08 PROCEDURE — 87086 URINE CULTURE/COLONY COUNT: CPT | Performed by: NURSE PRACTITIONER

## 2025-07-08 PROCEDURE — 81025 URINE PREGNANCY TEST: CPT | Performed by: NURSE PRACTITIONER

## 2025-07-08 PROCEDURE — 99214 OFFICE O/P EST MOD 30 MIN: CPT | Performed by: NURSE PRACTITIONER

## 2025-07-08 PROCEDURE — 81514 NFCT DS BV&VAGINITIS DNA ALG: CPT | Performed by: NURSE PRACTITIONER

## 2025-07-08 PROCEDURE — 81002 URINALYSIS NONAUTO W/O SCOPE: CPT | Performed by: NURSE PRACTITIONER

## 2025-07-08 RX ORDER — CEPHALEXIN 500 MG/1
500 CAPSULE ORAL 2 TIMES DAILY
Qty: 14 CAPSULE | Refills: 0 | Status: SHIPPED | OUTPATIENT
Start: 2025-07-08 | End: 2025-07-15

## 2025-07-09 LAB
BV BACTERIA DNA VAG QL NAA+PROBE: NEGATIVE
C GLABRATA DNA VAG QL NAA+PROBE: NEGATIVE
C KRUSEI DNA VAG QL NAA+PROBE: NEGATIVE
CANDIDA DNA VAG QL NAA+PROBE: NEGATIVE
T VAGINALIS DNA VAG QL NAA+PROBE: NEGATIVE

## 2025-07-09 NOTE — ED INITIAL ASSESSMENT (HPI)
Pt states seen at  7/5/25 for UTI symptoms. States urine dip and culture done.  States continues with vaginal irritation and urinary frequency.  States taking macrobid.  No dysuria.  No hematuria.  No fever.

## 2025-07-09 NOTE — ED PROVIDER NOTES
Patient Seen in: [facility]        History  Chief Complaint   Patient presents with    Urinary Symptoms     Stated Complaint: urinary symptoms    Subjective:   HPI    45 yr old female here for evaluation of vaginal irritation, discomfort and urinary frequency that has continued despite starting Macrobid for UTI on 7/5/25. She has had total 7 doses, feeling a bit better but in past has felt much better after 2 days. Denies fever or chills, nausea, vomiting, flank pain, pelvic pain, vaginal discharge. Took AZO 7/5 and 7/6 and then started it up again this morning due to continued symptoms.      Objective:     Past Medical History:    Depression    Scoliosis              History reviewed. No pertinent surgical history.             Social History     Socioeconomic History    Marital status:    Tobacco Use    Smoking status: Never     Passive exposure: Never    Smokeless tobacco: Never   Vaping Use    Vaping status: Never Used   Substance and Sexual Activity    Alcohol use: Not Currently    Drug use: Never              Review of Systems    Positive for stated complaint: urinary symptoms  Other systems are as noted in HPI.  Constitutional and vital signs reviewed.      All other systems reviewed and negative except as noted above.                  Physical Exam    ED Triage Vitals [07/08/25 1909]   /72   Pulse 78   Resp 18   Temp 98.1 °F (36.7 °C)   Temp src Oral   SpO2 98 %   O2 Device None (Room air)       Current Vitals:   No data recorded          Physical Exam  Vitals and nursing note reviewed.   Constitutional:       General: She is not in acute distress.     Appearance: Normal appearance. She is not ill-appearing, toxic-appearing or diaphoretic.   Cardiovascular:      Rate and Rhythm: Normal rate.   Pulmonary:      Effort: Pulmonary effort is normal. No respiratory distress.   Abdominal:      General: Abdomen is flat. There is no distension.      Tenderness: There is no abdominal  tenderness. There is no guarding.   Genitourinary:     General: Normal vulva.      Vagina: No vaginal discharge, erythema, tenderness, bleeding or lesions.      Cervix: Discharge present. No cervical motion tenderness, friability or erythema.      Adnexa:         Right: No tenderness.          Left: No tenderness.     Neurological:      Mental Status: She is alert.               ED Course  Labs Reviewed   [facility] POCT URINALYSIS DIPSTICK - Abnormal; Notable for the following components:       Result Value    Urine Color Orange (*)     Protein urine 30 (*)     Glucose, Urine 100 (*)     Ketone, Urine Trace (*)     Bilirubin, Urine Small (*)     Nitrite Urine Positive (*)     Urobilinogen urine 4.0 (*)     Leukocyte esterase urine Large (*)     All other components within normal limits   POCT PREGNANCY URINE - Normal   VAGINITIS VAGINOSIS PCR PANEL - Normal    Narrative:     This assay utilizes RT-PCR to detect the DNA of Gardnerella vaginalis, Lactobacillus spp. (L. crispatus and L. jensenii), Atopobium vaginae, Bacterial Vaginosis Associated Bacteria-2 (BVAB-2), and Megasphaera-1. An algorithm is used to determine if the targets detected represent a vaginal microbiome consistent with bacterial vaginosis or normal vaginal akbar.  FDA approval was based on data in the 18 years and over population.       URINE CULTURE, ROUTINE                         MDM    45 yr old F here for evaluation of vaginal irritation, discomfort and urinary frequency that has continued despite macrobid for uti 7/5/25. She has had 7 doses so far and feels better but usually she feels better by day 2 of meds.    ON exam, pt well appearing. Soft, nontender abdomen. No CVAT reported.  Discussed  exam, declined chaperone.   No obvious irritation, discharge on labias. Internal exam noted with mild discharge but no irritation to vaginal vault, FB. Vaginitis swab taken.    Differential diagnoses reflecting the complexity of care include but are not  limited to bacterial vaginosis, chemical urethritis, acute UTI, vaginal irritation, yeast infection.    Comorbidities that add complexity to management include: none  History obtained by an independent source was from: patient  My independent interpretations of studies include: UCG neg  Udip= skewed by AZO, culture sent.    Shared decision making was done by: patient, myself  Discussions of management was done with: patient  Patient is well appearing, non-toxic and in no acute distress.  Vital signs are stable.   Discussed due to continued irritation despite mild improvement, will send home on new abx for uti coverage.   No significant findings on  exam so will defer treatment for BV/yeast until results are back.  PT agrees with plan of care  Advised on keflex x 7 days. Pt reports last abx she took did not help her symptoms. Per chart review it was Cefadroxil.  Recommend good PO fluids, urinating often. Avoiding new lotions, detergents bodywash that can be causing irritation as well.    PT stable, nontoxic for dc home.  All questions answered. Return and ER precautions given.    Counseled: Patient, regarding diagnosis, regarding treatment plan, regarding diagnostic results, regarding prescription, I have discussed with the patient the results of tests, differential diagnosis, and warning signs and symptoms that should prompt immediate return. The patient understands these instructions and agrees to the follow-up plan provided. There is no barriers to learning. Appropriate f/u given. Patient agrees to return for any concerns/ problems/complications.            Medical Decision Making      Disposition and Plan     Clinical Impression:  1. Dysuria         Disposition:  Discharge  7/8/2025  7:43 pm    Follow-up:  No follow-up provider specified.        Medications Prescribed:  Discharge Medication List as of 7/8/2025  7:43 PM        START taking these medications    Details   cephALEXin 500 MG Oral Cap Take 1 capsule  (500 mg total) by mouth 2 (two) times daily for 7 days., Normal, Disp-14 capsule, R-0                   Supplementary Documentation: